# Patient Record
Sex: FEMALE | Race: BLACK OR AFRICAN AMERICAN | NOT HISPANIC OR LATINO | URBAN - METROPOLITAN AREA
[De-identification: names, ages, dates, MRNs, and addresses within clinical notes are randomized per-mention and may not be internally consistent; named-entity substitution may affect disease eponyms.]

---
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
## 2023-01-04 NOTE — ED PROVIDER NOTE - CPE EDP GASTRO NORM
normal (ped)... Partial Purse String (Simple) Text: Given the location of the defect and the characteristics of the surrounding skin a simple purse string closure was deemed most appropriate.  Undermining was performed circumferentially around the surgical defect.  A purse string suture was then placed and tightened. Wound tension of the circular defect prevented complete closure of the wound.

## 2023-01-25 ENCOUNTER — EMERGENCY (EMERGENCY)
Age: 18
LOS: 1 days | Discharge: HOME | End: 2023-01-25
Attending: EMERGENCY MEDICINE | Admitting: EMERGENCY MEDICINE
Payer: MEDICAID

## 2023-01-25 VITALS
SYSTOLIC BLOOD PRESSURE: 118 MMHG | OXYGEN SATURATION: 100 % | TEMPERATURE: 103 F | RESPIRATION RATE: 22 BRPM | WEIGHT: 231.38 LBS | DIASTOLIC BLOOD PRESSURE: 71 MMHG | HEART RATE: 135 BPM

## 2023-01-25 VITALS
OXYGEN SATURATION: 98 % | TEMPERATURE: 98 F | DIASTOLIC BLOOD PRESSURE: 49 MMHG | SYSTOLIC BLOOD PRESSURE: 107 MMHG | RESPIRATION RATE: 16 BRPM | HEART RATE: 105 BPM

## 2023-01-25 DIAGNOSIS — Z92.89 PERSONAL HISTORY OF OTHER MEDICAL TREATMENT: Chronic | ICD-10-CM

## 2023-01-25 LAB
ALBUMIN SERPL ELPH-MCNC: 4.5 G/DL — SIGNIFICANT CHANGE UP (ref 3.3–5)
ALP SERPL-CCNC: 50 U/L — SIGNIFICANT CHANGE UP (ref 40–120)
ALT FLD-CCNC: 24 U/L — SIGNIFICANT CHANGE UP (ref 4–33)
ANION GAP SERPL CALC-SCNC: 12 MMOL/L — SIGNIFICANT CHANGE UP (ref 7–14)
AST SERPL-CCNC: 21 U/L — SIGNIFICANT CHANGE UP (ref 4–32)
B PERT DNA SPEC QL NAA+PROBE: SIGNIFICANT CHANGE UP
B PERT+PARAPERT DNA PNL SPEC NAA+PROBE: SIGNIFICANT CHANGE UP
BASE EXCESS BLDV CALC-SCNC: 1.5 MMOL/L — SIGNIFICANT CHANGE UP (ref -2–3)
BASOPHILS # BLD AUTO: 0.04 K/UL — SIGNIFICANT CHANGE UP (ref 0–0.2)
BASOPHILS NFR BLD AUTO: 0.7 % — SIGNIFICANT CHANGE UP (ref 0–2)
BILIRUB SERPL-MCNC: 0.5 MG/DL — SIGNIFICANT CHANGE UP (ref 0.2–1.2)
BLOOD GAS VENOUS COMPREHENSIVE RESULT: SIGNIFICANT CHANGE UP
BORDETELLA PARAPERTUSSIS (RAPRVP): SIGNIFICANT CHANGE UP
BUN SERPL-MCNC: 11 MG/DL — SIGNIFICANT CHANGE UP (ref 7–23)
C PNEUM DNA SPEC QL NAA+PROBE: SIGNIFICANT CHANGE UP
CALCIUM SERPL-MCNC: 9.7 MG/DL — SIGNIFICANT CHANGE UP (ref 8.4–10.5)
CHLORIDE BLDV-SCNC: 103 MMOL/L — SIGNIFICANT CHANGE UP (ref 96–108)
CHLORIDE SERPL-SCNC: 100 MMOL/L — SIGNIFICANT CHANGE UP (ref 98–107)
CO2 BLDV-SCNC: 27.9 MMOL/L — HIGH (ref 22–26)
CO2 SERPL-SCNC: 24 MMOL/L — SIGNIFICANT CHANGE UP (ref 22–31)
CREAT SERPL-MCNC: 1.06 MG/DL — SIGNIFICANT CHANGE UP (ref 0.5–1.3)
EGFR: 78 ML/MIN/1.73M2 — SIGNIFICANT CHANGE UP
EOSINOPHIL # BLD AUTO: 0 K/UL — SIGNIFICANT CHANGE UP (ref 0–0.5)
EOSINOPHIL NFR BLD AUTO: 0 % — SIGNIFICANT CHANGE UP (ref 0–6)
FLUAV SUBTYP SPEC NAA+PROBE: SIGNIFICANT CHANGE UP
FLUBV RNA SPEC QL NAA+PROBE: SIGNIFICANT CHANGE UP
GAS PNL BLDV: 133 MMOL/L — LOW (ref 136–145)
GLUCOSE BLDV-MCNC: 109 MG/DL — HIGH (ref 70–99)
GLUCOSE SERPL-MCNC: 112 MG/DL — HIGH (ref 70–99)
HADV DNA SPEC QL NAA+PROBE: SIGNIFICANT CHANGE UP
HCO3 BLDV-SCNC: 27 MMOL/L — SIGNIFICANT CHANGE UP (ref 22–29)
HCOV 229E RNA SPEC QL NAA+PROBE: SIGNIFICANT CHANGE UP
HCOV HKU1 RNA SPEC QL NAA+PROBE: SIGNIFICANT CHANGE UP
HCOV NL63 RNA SPEC QL NAA+PROBE: SIGNIFICANT CHANGE UP
HCOV OC43 RNA SPEC QL NAA+PROBE: SIGNIFICANT CHANGE UP
HCT VFR BLD CALC: 41.3 % — SIGNIFICANT CHANGE UP (ref 34.5–45)
HCT VFR BLDA CALC: 41 % — SIGNIFICANT CHANGE UP (ref 34.5–46.5)
HGB BLD CALC-MCNC: 13.7 G/DL — SIGNIFICANT CHANGE UP (ref 11.5–15.5)
HGB BLD-MCNC: 13.6 G/DL — SIGNIFICANT CHANGE UP (ref 11.5–15.5)
HMPV RNA SPEC QL NAA+PROBE: SIGNIFICANT CHANGE UP
HPIV1 RNA SPEC QL NAA+PROBE: SIGNIFICANT CHANGE UP
HPIV2 RNA SPEC QL NAA+PROBE: SIGNIFICANT CHANGE UP
HPIV3 RNA SPEC QL NAA+PROBE: SIGNIFICANT CHANGE UP
HPIV4 RNA SPEC QL NAA+PROBE: SIGNIFICANT CHANGE UP
IANC: 4.45 K/UL — SIGNIFICANT CHANGE UP (ref 1.8–7.4)
IMM GRANULOCYTES NFR BLD AUTO: 0.3 % — SIGNIFICANT CHANGE UP (ref 0–0.9)
LACTATE BLDV-MCNC: 1.7 MMOL/L — SIGNIFICANT CHANGE UP (ref 0.5–2)
LYMPHOCYTES # BLD AUTO: 0.67 K/UL — LOW (ref 1–3.3)
LYMPHOCYTES # BLD AUTO: 11.6 % — LOW (ref 13–44)
M PNEUMO DNA SPEC QL NAA+PROBE: SIGNIFICANT CHANGE UP
MCHC RBC-ENTMCNC: 29 PG — SIGNIFICANT CHANGE UP (ref 27–34)
MCHC RBC-ENTMCNC: 32.9 GM/DL — SIGNIFICANT CHANGE UP (ref 32–36)
MCV RBC AUTO: 88.1 FL — SIGNIFICANT CHANGE UP (ref 80–100)
MONOCYTES # BLD AUTO: 0.58 K/UL — SIGNIFICANT CHANGE UP (ref 0–0.9)
MONOCYTES NFR BLD AUTO: 10.1 % — SIGNIFICANT CHANGE UP (ref 2–14)
NEUTROPHILS # BLD AUTO: 4.45 K/UL — SIGNIFICANT CHANGE UP (ref 1.8–7.4)
NEUTROPHILS NFR BLD AUTO: 77.3 % — HIGH (ref 43–77)
NRBC # BLD: 0 /100 WBCS — SIGNIFICANT CHANGE UP (ref 0–0)
NRBC # FLD: 0 K/UL — SIGNIFICANT CHANGE UP (ref 0–0)
PCO2 BLDV: 43 MMHG — HIGH (ref 39–42)
PH BLDV: 7.4 — SIGNIFICANT CHANGE UP (ref 7.32–7.43)
PLATELET # BLD AUTO: 315 K/UL — SIGNIFICANT CHANGE UP (ref 150–400)
PO2 BLDV: 33 MMHG — SIGNIFICANT CHANGE UP
POTASSIUM BLDV-SCNC: 3.8 MMOL/L — SIGNIFICANT CHANGE UP (ref 3.5–5.1)
POTASSIUM SERPL-MCNC: 3.7 MMOL/L — SIGNIFICANT CHANGE UP (ref 3.5–5.3)
POTASSIUM SERPL-SCNC: 3.7 MMOL/L — SIGNIFICANT CHANGE UP (ref 3.5–5.3)
PROT SERPL-MCNC: 7.9 G/DL — SIGNIFICANT CHANGE UP (ref 6–8.3)
RAPID RVP RESULT: DETECTED
RBC # BLD: 4.69 M/UL — SIGNIFICANT CHANGE UP (ref 3.8–5.2)
RBC # FLD: 12.6 % — SIGNIFICANT CHANGE UP (ref 10.3–14.5)
RSV RNA SPEC QL NAA+PROBE: SIGNIFICANT CHANGE UP
RV+EV RNA SPEC QL NAA+PROBE: SIGNIFICANT CHANGE UP
SAO2 % BLDV: 57.3 % — SIGNIFICANT CHANGE UP
SARS-COV-2 RNA SPEC QL NAA+PROBE: DETECTED
SODIUM SERPL-SCNC: 136 MMOL/L — SIGNIFICANT CHANGE UP (ref 135–145)
WBC # BLD: 5.76 K/UL — SIGNIFICANT CHANGE UP (ref 3.8–10.5)
WBC # FLD AUTO: 5.76 K/UL — SIGNIFICANT CHANGE UP (ref 3.8–10.5)

## 2023-01-25 PROCEDURE — 99284 EMERGENCY DEPT VISIT MOD MDM: CPT

## 2023-01-25 RX ORDER — SODIUM CHLORIDE 9 MG/ML
1000 INJECTION INTRAMUSCULAR; INTRAVENOUS; SUBCUTANEOUS ONCE
Refills: 0 | Status: COMPLETED | OUTPATIENT
Start: 2023-01-25 | End: 2023-01-25

## 2023-01-25 RX ORDER — IBUPROFEN 200 MG
400 TABLET ORAL ONCE
Refills: 0 | Status: COMPLETED | OUTPATIENT
Start: 2023-01-25 | End: 2023-01-25

## 2023-01-25 RX ORDER — ACETAMINOPHEN 500 MG
975 TABLET ORAL ONCE
Refills: 0 | Status: COMPLETED | OUTPATIENT
Start: 2023-01-25 | End: 2023-01-25

## 2023-01-25 RX ADMIN — SODIUM CHLORIDE 1000 MILLILITER(S): 9 INJECTION INTRAMUSCULAR; INTRAVENOUS; SUBCUTANEOUS at 04:11

## 2023-01-25 RX ADMIN — Medication 400 MILLIGRAM(S): at 02:08

## 2023-01-25 RX ADMIN — Medication 975 MILLIGRAM(S): at 04:11

## 2023-01-25 RX ADMIN — SODIUM CHLORIDE 1000 MILLILITER(S): 9 INJECTION INTRAMUSCULAR; INTRAVENOUS; SUBCUTANEOUS at 02:04

## 2023-01-25 NOTE — ED PROVIDER NOTE - ATTENDING CONTRIBUTION TO CARE
17-year-old female with no sniffing past medical history presenting with fevers lightheadedness and palpitations that started today.  Patient has no shortness of breath nausea vomiting abdominal pain.  The patient was seen at [facility] earlier today where she had labs IV fluids and a COVID swab.  Labs unremarkable patient found to have COVID at this time.  At the arrival here in the restaurant patient is still tachycardic and febrile.  We will treat the patient symptomatically with IV fluids and antipyretics.  If the patient's vital signs improved and she feels symptomatically better there is no indication for admission at this time.  [facility] this is secondary to her viral infection.  Will reassess once has gotten fluids and medications.    Insert physical exam 17-year-old female with no sniffing past medical history presenting with fevers lightheadedness and palpitations that started today.  Patient has no shortness of breath nausea vomiting abdominal pain.  The patient was seen at [facility] earlier today where she had labs IV fluids and a COVID swab.  Labs unremarkable patient found to have COVID at this time.  At the arrival here in the restaurant patient is still tachycardic and febrile.  We will treat the patient symptomatically with IV fluids and antipyretics.  If the patient's vital signs improved and she feels symptomatically better there is no indication for admission at this time.  [facility] was [facility] for [facility] this is secondary to her viral infection.  Will reassess once has gotten fluids and medications.    Vitals: I have reviewed the patients vital signs  General: nontoxic appearing  HEENT: Atraumatic, normocephalic, airway patent  Eyes: EOMI, tracking appropriately  Neck: no tracheal deviation  Chest/Lungs: no trauma, symmetric chest rise, speaking in complete sentences,  no resp distress  Heart: skin and extremities well perfused, tachycardic rate and rhythm  Neuro: A+Ox3, appears non focal  MSK: no deformities  Skin: no cyanosis, no jaundice   Psych:  Normal mood and affect

## 2023-01-25 NOTE — ED PEDIATRIC NURSE REASSESSMENT NOTE - NS ED NURSE REASSESS COMMENT FT2
pt states that thus far the bolus she received made her feel better. Pt is currently on her way to adult facility. Nurse answered all of the patient mother questions. No further discomfort noted

## 2023-01-25 NOTE — ED PROVIDER NOTE - NS ED ROS FT
CONST: + Fever, dizzy  EYES: no pain, no vision changes  ENT: + Sore throat  CV: no chest pain, no leg swelling  RESP: no shortness of breath. + Cough  ABD: no abdominal pain, no nausea, no vomiting, no diarrhea  : no dysuria, no flank pain, no hematuria  MSK: no back pain, no extremity pain  NEURO: no headache or additional neurologic complaints  SKIN:  no rash

## 2023-01-25 NOTE — ED ADULT NURSE NOTE - OBJECTIVE STATEMENT
pt received to rm17. pt A&Ox4, respirations even and unlabored, completing full sentences. pt c/o fevers, throat pain, dizziness since yesterday at home. pt was received from [facility] with IV to left ac, flushes with no resistance, + blood return. pmhx of strep throat and anaphylactic shock. pt noted to be tachycardic to 120s. Denies cp, sob, h/a and palpitation. medicated as per MD orders, fluids running as ordred. stretcher in lowest pos, siderails up, family at bedside.

## 2023-01-25 NOTE — ED ADULT NURSE NOTE - CHIEF COMPLAINT QUOTE
Transfer from [facility]. C/o throat pain, dizziness, fever and high heart rate since yesterday from home. Received with IV to left ac, received IV fluid and Motrin at [facility]. Denies cp, sob, and palpitation. hx of strep throat and anaphylactic shock

## 2023-01-25 NOTE — ED PROVIDER NOTE - PHYSICAL EXAMINATION
GENERAL: Awake, alert, NAD  HEENT: NC/AT, moist mucous membranes, EOMI  LUNGS: CTAB, no wheezes or crackles   CARDIAC: Tachycardic  ABDOMEN: Soft, non tender, non distended, no rebound, no guarding  EXT: No edema, no calf tenderness, no deformities.  NEURO: A&Ox3. Moving all extremities.  SKIN: Warm and dry. No rash.  PSYCH: Normal affect.

## 2023-01-25 NOTE — ED PROVIDER NOTE - PATIENT PORTAL LINK FT
You can access the FollowMyHealth Patient Portal offered by [facility] by registering at the following website: [email or web address]. By joining [employer]’s FollowMyHealth portal, you will also be able to view your health information using other applications (apps) compatible with our system.

## 2023-01-25 NOTE — ED PEDIATRIC TRIAGE NOTE - TEMP(CELSIUS)
Surgery Clinic Note    Zac Plunkett is a 73 y.o. 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 Incision intact with mild cellulitis, no drainage. Has f/u with thoracic surgery and oncology. No drainage. No pain.  No f/c/n/v/sob/cp    ROS:  Negative except above    Pathology:  Final Pathologic Diagnosis Part 1 (submitted as right thigh proximal mass), Part 2 (submitted as right   thigh distal mass):   -Myxofibrosarcoma, high-grade   -Both lesions appear completely excised   -Previous surgical case S -, right thigh excision, diagnosed as a   high-grade myxofibrosarcoma, was reviewed and shows features identical to   that in both parts 1 and 2 of this case,          PE:  Vitals:    01/28/22 1029   BP: 137/75   Pulse: 69       NAD  Ambulates well  No belabored breathing  Right thigh, 2x incisions. Cellulitis. No drainage no fluctuance.    A/P:  Zac Plunkett is a 73 y.o. year old male w right thigh myxofibrosarcoma    -neosporin for 5 days  -clindamycin PO for 5 days  -rtc after antibiotics. If it resolves can f/u PRN as he has appts to oncology and thoracic surgery for lung lesion    Arun Mao  General Surgery - [facility]  1/28/2022  10:45 AM           39.5

## 2023-01-25 NOTE — ED PROVIDER NOTE - NSFOLLOWUPINSTRUCTIONS_ED_ALL_ED_FT
You were found with covid. Please find your results attached to this document. Important to stay hydrated.     Follow up with your primary care doctor in 5-7 days.     May take Tylenol and Ibuprofen for fevers, chills, and/or body aches.     Please return to the ED for shortness of breath or any other concerns.

## 2023-01-25 NOTE — ED ADULT TRIAGE NOTE - CHIEF COMPLAINT QUOTE
Transfer from [facility]. C/o throat pain, dizziness, fever and high heart rate since yesterday from home. Received with IV to left ac, received IV fluid and motrin at [facility]. hx of strep throat and anaphylactic shock Transfer from [facility]. C/o throat pain, dizziness, fever and high heart rate since yesterday from home. Received with IV to left ac, received IV fluid and Motrin at [facility]. hx of strep throat and anaphylactic shock Transfer from [facility]. C/o throat pain, dizziness, fever and high heart rate since yesterday from home. Received with IV to left ac, received IV fluid and Motrin at [facility]. Denies cp, sob, and palpitation. hx of strep throat and anaphylactic shock

## 2023-01-25 NOTE — ED PROVIDER NOTE - CLINICAL SUMMARY MEDICAL DECISION MAKING FREE TEXT BOX
17-year-old female patient with no significant history who presents to the emergency department for fevers, tachycardia, dizziness.  On exam found tachycardic in the 130s, clear breath sounds.  Coming from [facility] as a transfer.  Found with COVID.  Patient febrile here and tachycardic will give Tylenol and IV fluids reassess.

## 2023-01-25 NOTE — ED PEDIATRIC NURSE NOTE - CHIEF COMPLAINT QUOTE
Patient c/o fevers, high heart rate and dizziness starting today. No medications given recently. Patient awake and alert in triage. PMHx "sepsis and shock" NKA. IUTD.  Patient meets code sepsis based on vital signs.

## 2023-01-25 NOTE — ED PROVIDER NOTE - RAPID ASSESSMENT
16 y/o F with fever and "not feeling well".  Mom states tachy to 140 at home.  On arrival had significant tachycardia and febrile.  IV bolus, labs and VBG with a lactate.  Blood cultures obtained.  Abx held because likely viral.  RVP sent as well.  VBG with 7.4 and normal lactate.  Signed out to Dr Porter.

## 2023-01-25 NOTE — ED PEDIATRIC TRIAGE NOTE - CHIEF COMPLAINT QUOTE
Patient c/o fevers, high heart rate and dizziness starting today. No medications given recently. Patient awake and alert in triage. PMHx "sepsis and shock" NKA. IUTD. Patient c/o fevers, high heart rate and dizziness starting today. No medications given recently. Patient awake and alert in triage. PMHx "sepsis and shock" NKA. IUTD.  Patient meets code sepsis based on vital signs.

## 2023-01-25 NOTE — ED PROVIDER NOTE - OBJECTIVE STATEMENT
17-year-old female patient with no significant past medical history who presents emergency department for 17-year-old female patient with no significant past medical history who presents emergency department for fevers, lightheadedness, tachycardia since today.  Patient seen at [facility] and was found with COVID.  Patient denies any shortness of breath, nausea, vomiting, abdominal pain, diarrhea or poor appetite.  Patient does endorse sore throat and coughing.  Patient is COVID vaccinated with booster.

## 2023-01-30 LAB
CULTURE RESULTS: SIGNIFICANT CHANGE UP
SPECIMEN SOURCE: SIGNIFICANT CHANGE UP

## 2023-11-22 NOTE — PATIENT PROFILE PEDIATRIC. - MEDICATION, ABILITY TO FOLLOW SCHEDULE, PROFILE
"Patient was seen today for an educational visit. Visited patient at bedside to provide education related to heart failure. Patient given education booklet entitled ""Living well with Heart Failure\"". Action:   -Taught patient contributing factors leading to heart failure  -Provided information on [facility] and services available to manage fluid volume  -Reviewed WARNING signs of Heart Failure (given CHF Zone handout)  -Reviewed low sodium diet (2,000 mg) and fluid restriction  -Educated on importance of monitoring daily weight  -Educated patient on importance on medication compliance, low sodium diet, daily blood pressure check, signs and symptoms of fluid volume excess. Response:  Patient and daughter verbalized understanding utilizing teach-back method. Will continue to follow patient while admitted. Provided 60 minutes of education on the above.   " no

## 2024-02-26 ENCOUNTER — EMERGENCY (EMERGENCY)
Facility: HOSPITAL | Age: 19
LOS: 1 days | Discharge: HOME | End: 2024-02-26
Attending: EMERGENCY MEDICINE | Admitting: EMERGENCY MEDICINE
Payer: MEDICAID

## 2024-02-26 VITALS
SYSTOLIC BLOOD PRESSURE: 115 MMHG | TEMPERATURE: 98 F | DIASTOLIC BLOOD PRESSURE: 78 MMHG | RESPIRATION RATE: 16 BRPM | OXYGEN SATURATION: 99 % | HEART RATE: 96 BPM

## 2024-02-26 VITALS
HEART RATE: 87 BPM | DIASTOLIC BLOOD PRESSURE: 87 MMHG | SYSTOLIC BLOOD PRESSURE: 127 MMHG | WEIGHT: 190.04 LBS | TEMPERATURE: 98 F | HEIGHT: 67 IN | OXYGEN SATURATION: 99 % | RESPIRATION RATE: 115 BRPM

## 2024-02-26 DIAGNOSIS — Z92.89 PERSONAL HISTORY OF OTHER MEDICAL TREATMENT: Chronic | ICD-10-CM

## 2024-02-26 LAB
ALBUMIN SERPL ELPH-MCNC: 4 G/DL — SIGNIFICANT CHANGE UP (ref 3.4–5)
ALP SERPL-CCNC: 35 U/L — LOW (ref 40–120)
ALT FLD-CCNC: 16 U/L — SIGNIFICANT CHANGE UP (ref 12–42)
ANION GAP SERPL CALC-SCNC: 8 MMOL/L — LOW (ref 9–16)
AST SERPL-CCNC: 14 U/L — LOW (ref 15–37)
BASOPHILS # BLD AUTO: 0.05 K/UL — SIGNIFICANT CHANGE UP (ref 0–0.2)
BASOPHILS NFR BLD AUTO: 0.7 % — SIGNIFICANT CHANGE UP (ref 0–2)
BILIRUB SERPL-MCNC: 0.5 MG/DL — SIGNIFICANT CHANGE UP (ref 0.2–1.2)
BUN SERPL-MCNC: 12 MG/DL — SIGNIFICANT CHANGE UP (ref 7–23)
CALCIUM SERPL-MCNC: 9 MG/DL — SIGNIFICANT CHANGE UP (ref 8.5–10.5)
CHLORIDE SERPL-SCNC: 104 MMOL/L — SIGNIFICANT CHANGE UP (ref 96–108)
CO2 SERPL-SCNC: 30 MMOL/L — SIGNIFICANT CHANGE UP (ref 22–31)
CREAT SERPL-MCNC: 1.03 MG/DL — SIGNIFICANT CHANGE UP (ref 0.5–1.3)
EGFR: 80 ML/MIN/1.73M2 — SIGNIFICANT CHANGE UP
EOSINOPHIL # BLD AUTO: 0.01 K/UL — SIGNIFICANT CHANGE UP (ref 0–0.5)
EOSINOPHIL NFR BLD AUTO: 0.1 % — SIGNIFICANT CHANGE UP (ref 0–6)
GLUCOSE SERPL-MCNC: 90 MG/DL — SIGNIFICANT CHANGE UP (ref 70–99)
HCG SERPL-ACNC: <1 MIU/ML — SIGNIFICANT CHANGE UP
HCT VFR BLD CALC: 49.8 % — HIGH (ref 34.5–45)
HGB BLD-MCNC: 16.3 G/DL — HIGH (ref 11.5–15.5)
IMM GRANULOCYTES NFR BLD AUTO: 0.3 % — SIGNIFICANT CHANGE UP (ref 0–0.9)
LIDOCAIN IGE QN: 55 U/L — SIGNIFICANT CHANGE UP (ref 16–77)
LYMPHOCYTES # BLD AUTO: 2.57 K/UL — SIGNIFICANT CHANGE UP (ref 1–3.3)
LYMPHOCYTES # BLD AUTO: 34.8 % — SIGNIFICANT CHANGE UP (ref 13–44)
MCHC RBC-ENTMCNC: 29.7 PG — SIGNIFICANT CHANGE UP (ref 27–34)
MCHC RBC-ENTMCNC: 32.7 GM/DL — SIGNIFICANT CHANGE UP (ref 32–36)
MCV RBC AUTO: 90.9 FL — SIGNIFICANT CHANGE UP (ref 80–100)
MONOCYTES # BLD AUTO: 0.44 K/UL — SIGNIFICANT CHANGE UP (ref 0–0.9)
MONOCYTES NFR BLD AUTO: 6 % — SIGNIFICANT CHANGE UP (ref 2–14)
NEUTROPHILS # BLD AUTO: 4.29 K/UL — SIGNIFICANT CHANGE UP (ref 1.8–7.4)
NEUTROPHILS NFR BLD AUTO: 58.1 % — SIGNIFICANT CHANGE UP (ref 43–77)
NRBC # BLD: 0 /100 WBCS — SIGNIFICANT CHANGE UP (ref 0–0)
NT-PROBNP SERPL-SCNC: 9 PG/ML — SIGNIFICANT CHANGE UP
PLATELET # BLD AUTO: 348 K/UL — SIGNIFICANT CHANGE UP (ref 150–400)
POTASSIUM SERPL-MCNC: 3.5 MMOL/L — SIGNIFICANT CHANGE UP (ref 3.5–5.3)
POTASSIUM SERPL-SCNC: 3.5 MMOL/L — SIGNIFICANT CHANGE UP (ref 3.5–5.3)
PROT SERPL-MCNC: 8 G/DL — SIGNIFICANT CHANGE UP (ref 6.4–8.2)
RBC # BLD: 5.48 M/UL — HIGH (ref 3.8–5.2)
RBC # FLD: 12.6 % — SIGNIFICANT CHANGE UP (ref 10.3–14.5)
SODIUM SERPL-SCNC: 142 MMOL/L — SIGNIFICANT CHANGE UP (ref 132–145)
TROPONIN I, HIGH SENSITIVITY RESULT: <4 NG/L — SIGNIFICANT CHANGE UP
WBC # BLD: 7.38 K/UL — SIGNIFICANT CHANGE UP (ref 3.8–10.5)
WBC # FLD AUTO: 7.38 K/UL — SIGNIFICANT CHANGE UP (ref 3.8–10.5)

## 2024-02-26 PROCEDURE — 72125 CT NECK SPINE W/O DYE: CPT | Mod: 26,MC

## 2024-02-26 PROCEDURE — 70450 CT HEAD/BRAIN W/O DYE: CPT | Mod: 26,MC

## 2024-02-26 PROCEDURE — 71045 X-RAY EXAM CHEST 1 VIEW: CPT | Mod: 26

## 2024-02-26 PROCEDURE — 73564 X-RAY EXAM KNEE 4 OR MORE: CPT | Mod: 26,LT

## 2024-02-26 PROCEDURE — 99285 EMERGENCY DEPT VISIT HI MDM: CPT

## 2024-02-26 RX ORDER — SODIUM CHLORIDE 9 MG/ML
1000 INJECTION INTRAMUSCULAR; INTRAVENOUS; SUBCUTANEOUS ONCE
Refills: 0 | Status: COMPLETED | OUTPATIENT
Start: 2024-02-26 | End: 2024-02-26

## 2024-02-26 RX ADMIN — SODIUM CHLORIDE 1000 MILLILITER(S): 9 INJECTION INTRAMUSCULAR; INTRAVENOUS; SUBCUTANEOUS at 19:57

## 2024-02-26 NOTE — ED PROVIDER NOTE - CLINICAL SUMMARY MEDICAL DECISION MAKING FREE TEXT BOX
18-year-old female with no significant past medical history presents with syncopal episode preceded by prodrome of lightheadedness in setting of wearing a heavy coat on a heated and crowded subway.  On exam, is afebrile, vital signs are stable.  Patient has a tender hematoma to the left scalp, tenderness over the left patella with a small abrasion over the left patella as well.  EKG normal sinus rhythm, no prolonged QT, no delta wave, no Brugada pattern, no patterns concerning for HOCM.  Suspect vasovagal syncope.  Plan for labs, IV hydration, CT head and C-spine, x-ray left knee, monitor, reassess.    Imaging negative.  Labs unrevealing.  Patient feels much better after IV hydration, wants to go home.  Will refer to cardiology as outpatient as needed.

## 2024-02-26 NOTE — ED ADULT TRIAGE NOTE - BEFAST ARM NUMBNESS
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 Colon on 05/31  \par \par \par 
No

## 2024-02-26 NOTE — ED PROVIDER NOTE - PHYSICAL EXAMINATION
Constitutional: awake and alert, in no acute distress  HEENT: head normocephalic, tender hematoma to L scalp. moist mucous membranes  Eyes: extraocular movements intact, normal conjunctiva  Neck: supple, normal ROM  Cardiovascular: regular rate   Pulmonary: no respiratory distress  Gastrointestinal: abdomen flat and nondistended, nontender to palpation  Skin: warm, dry, normal for ethnicity  Musculoskeletal: TTP to L patella with small abrasion over patella.  Full AROM/ PROM of knee.    Neurological: oriented x4, no focal neurologic deficit.   Psychiatric: calm and cooperative

## 2024-02-26 NOTE — ED PROVIDER NOTE - CARE PROVIDER_API CALL
Frankie Cordero  Cardiovascular Disease  [address] [place] [address], [address]  [place] [address]  Phone: [phone]  Fax: [phone]  Follow Up Time:    Frankie Cordero  Cardiovascular Disease  [address], [address]  [place] [address]  Phone: [phone]  Fax: [phone]  Follow Up Time:     Manda Gomez  Cardiovascular Disease  [address] [facility], [address]  [place] [address]  Phone: [phone]  Fax: [phone]  Follow Up Time:     Joan Mccormack  Cardiology  [address] [place] [address], [address]  [place] [address]  Phone: [phone]  Fax: [phone]  Follow Up Time:

## 2024-02-26 NOTE — ED PROVIDER NOTE - PATIENT PORTAL LINK FT
You can access the FollowMyHealth Patient Portal offered by [facility] by registering at the following website: [email or web address]. By joining its learning’s FollowMyHealth portal, you will also be able to view your health information using other applications (apps) compatible with our system.

## 2024-02-26 NOTE — ED ADULT TRIAGE NOTE - CHIEF COMPLAINT QUOTE
Pt BIBA S/P syncopal episode in the train. Was pulled onto platform by other passengers. A&Ox3 with VSS on arrival. Ambulatory in triage.

## 2024-02-26 NOTE — ED PROVIDER NOTE - PROVIDER TOKENS
Last Office Visit/CPE for that condition:  10/10/2022    Date of next appointment:   10/11/2023    Reason Protocol Failed:    ondansetron (ZOFRAN ODT) 4 MG disintegrating tablet 10 tablet 3 10/10/2022     Sig - Route: Place 1 tablet onto the tongue every 8 hours as needed for Nausea. - Translingual      No protocol given please advise     PROVIDER:[TOKEN:[[id number]]] PROVIDER:[TOKEN:[[id number]]],PROVIDER:[TOKEN:[[id number]]],PROVIDER:[TOKEN:[[id number]]]

## 2024-02-26 NOTE — ED ADULT NURSE NOTE - OBJECTIVE STATEMENT
Pt presents to ed BIBA for syncope with + head strike. [place] with nurses standing over her. Pt reports no change in daily habits, ate well today. Hx allergy to some antiparasitic medication. No other hx reported. NIHSS negative for any defecits. 20grac initiated and labs sent. Fluids initiated on patient as ordered. Plan of care ongoing

## 2024-02-26 NOTE — ED PROVIDER NOTE - OBJECTIVE STATEMENT
18-year-old female with no significant past medical history, currently on p.o. Cipro for UTI, presents with syncopal episode just prior to arrival on the subway.  Patient states she was wearing a heavy coat and felt very warm when she got on the subway.  States that that he was high on the subway and the subway was extremely crowded.  Patient states she felt overheated, lightheaded, nauseous, and had nowhere to sit down.  States she reached to grab a pole to steady herself, then fainted.  Patient states she bumped her head against the subway door, was helped off of the subway by bystanders who called 911 to bring her to the ED.  No chest pain or shortness of breath.  No palpitations.  No headache.  No nausea or vomiting.  No fevers or chills.  No dysuria or hematuria currently.  No flank pain.
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
## 2024-03-01 DIAGNOSIS — R55 SYNCOPE AND COLLAPSE: ICD-10-CM
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
## 2024-03-01 DIAGNOSIS — S80.212A ABRASION, LEFT KNEE, INITIAL ENCOUNTER: ICD-10-CM

## 2024-03-01 DIAGNOSIS — S00.03XA CONTUSION OF SCALP, INITIAL ENCOUNTER: ICD-10-CM

## 2024-03-15 RX ORDER — FAMOTIDINE 20 MG/1
20 TABLET, FILM COATED ORAL
Qty: 30 | Refills: 0 | Status: DISCONTINUED | COMMUNITY
Start: 2021-11-10 | End: 2024-03-15

## 2024-03-15 RX ORDER — CETIRIZINE HYDROCHLORIDE 10 MG/1
10 TABLET, COATED ORAL
Qty: 30 | Refills: 3 | Status: DISCONTINUED | COMMUNITY
Start: 2021-11-10 | End: 2024-03-15

## 2024-03-20 ENCOUNTER — NON-APPOINTMENT (OUTPATIENT)
Age: 19
End: 2024-03-20

## 2024-03-20 ENCOUNTER — APPOINTMENT (OUTPATIENT)
Dept: CARDIOLOGY | Facility: CLINIC | Age: 19
End: 2024-03-20
Payer: MEDICAID

## 2024-03-20 VITALS
HEART RATE: 102 BPM | BODY MASS INDEX: 30.53 KG/M2 | DIASTOLIC BLOOD PRESSURE: 78 MMHG | HEIGHT: 66 IN | SYSTOLIC BLOOD PRESSURE: 115 MMHG | OXYGEN SATURATION: 100 % | WEIGHT: 190 LBS

## 2024-03-20 PROCEDURE — 99205 OFFICE O/P NEW HI 60 MIN: CPT

## 2024-03-22 NOTE — REASON FOR VISIT
[Symptom and Test Evaluation] : symptom and test evaluation [FreeTextEntry1] : 19 yo F with PMHx of gallstones s/p lap mina with recent episode of syncope while on a train, evaluated in the ED, referred for cardiology evaluation.   She was on the train from [place] to [facility] for school. Train was very crowded and hot. She kept her jacket on. She started to feel really warm, room was spinning around her while standing (too crowded for a seat) and she ultimately LOC and hit her head with a bump. She was taken to the ED for evaluation. No chest pain, shortness of breath of palpitations preceeding the event. She thinks she was dehydrated. She currently exercises without limitations.   No prior cardiac history. Normal childhood, no limitations. Able to keep up with her classmates. Some learning disabilities but overcame. No issues with physical activity. Sibilings with no issues. Mom present with her at the visit.   Prior to this no other episodes of syncope, pre-syncope, chest pain, SOB, palpitations. Only hospitalized for anaphylxis to an prior antifungal cleanse.   No drugs, smoking or alcohol.   Labs at ED: negative troponin, EKG in the 100s but no concerning abnormalities.

## 2024-03-22 NOTE — PHYSICAL EXAM
[Normal Conjunctiva] : normal conjunctiva [No Carotid Bruit] : no carotid bruit [Normal Venous Pressure] : normal venous pressure [No Murmur] : no murmur [Normal S1, S2] : normal S1, S2 [No Rub] : no rub [No Gallop] : no gallop [Non Tender] : non-tender [Soft] : abdomen soft [No Edema] : no edema [Normal Gait] : normal gait [No Cyanosis] : no cyanosis [No Varicosities] : no varicosities [No Rash] : no rash [No Skin Lesions] : no skin lesions [Moves all extremities] : moves all extremities [No Focal Deficits] : no focal deficits [Normal Speech] : normal speech [Normal memory] : normal memory [Alert and Oriented] : alert and oriented [de-identified] : NAD [de-identified] : CTAB

## 2024-03-22 NOTE — ASSESSMENT
[FreeTextEntry1] : #syncope: nl EKG today with normal intervals, axis and rate. Story sounds vasovagal. Will get TTE to assess for structural abnormalities. She will monitor for different symptoms.  Basic labs.  -TTE, repeat labs -f/u once imaging and labs are completed  -discussed for syncope, important to stay hydrated, consider compression stockings if on her feet for most of the day, and if she starts to feel LH/dizzy sit down

## 2024-03-28 ENCOUNTER — NON-APPOINTMENT (OUTPATIENT)
Age: 19
End: 2024-03-28

## 2024-04-05 ENCOUNTER — APPOINTMENT (OUTPATIENT)
Dept: CARDIOLOGY | Facility: CLINIC | Age: 19
End: 2024-04-05

## 2024-04-08 LAB
ALBUMIN SERPL ELPH-MCNC: 4.6 G/DL
ALP BLD-CCNC: 43 U/L
ALT SERPL-CCNC: 15 U/L
ANION GAP SERPL CALC-SCNC: 11 MMOL/L
AST SERPL-CCNC: 15 U/L
BILIRUB SERPL-MCNC: 0.4 MG/DL
BUN SERPL-MCNC: 10 MG/DL
CALCIUM SERPL-MCNC: 9.6 MG/DL
CHLORIDE SERPL-SCNC: 103 MMOL/L
CHOLEST SERPL-MCNC: 161 MG/DL
CO2 SERPL-SCNC: 25 MMOL/L
CREAT SERPL-MCNC: 0.94 MG/DL
EGFR: 89 ML/MIN/1.73M2
ESTIMATED AVERAGE GLUCOSE: 105 MG/DL
GLUCOSE SERPL-MCNC: 86 MG/DL
HBA1C MFR BLD HPLC: 5.3 %
HCT VFR BLD CALC: 40.3 %
HDLC SERPL-MCNC: 65 MG/DL
HGB BLD-MCNC: 13.4 G/DL
LDLC SERPL CALC-MCNC: 73 MG/DL
MCHC RBC-ENTMCNC: 30.5 PG
MCHC RBC-ENTMCNC: 33.3 GM/DL
MCV RBC AUTO: 91.8 FL
NONHDLC SERPL-MCNC: 96 MG/DL
PLATELET # BLD AUTO: 324 K/UL
POTASSIUM SERPL-SCNC: 4.8 MMOL/L
PROT SERPL-MCNC: 7.4 G/DL
RBC # BLD: 4.39 M/UL
RBC # FLD: 13 %
SODIUM SERPL-SCNC: 138 MMOL/L
TRIGL SERPL-MCNC: 134 MG/DL
WBC # FLD AUTO: 6.23 K/UL

## 2024-04-24 ENCOUNTER — APPOINTMENT (OUTPATIENT)
Dept: CARDIOLOGY | Facility: CLINIC | Age: 19
End: 2024-04-24
Payer: MEDICAID

## 2024-04-24 VITALS
HEART RATE: 87 BPM | HEIGHT: 67 IN | OXYGEN SATURATION: 100 % | WEIGHT: 185 LBS | DIASTOLIC BLOOD PRESSURE: 70 MMHG | SYSTOLIC BLOOD PRESSURE: 101 MMHG | BODY MASS INDEX: 29.03 KG/M2

## 2024-04-24 DIAGNOSIS — I20.89 OTHER FORMS OF ANGINA PECTORIS: ICD-10-CM

## 2024-04-24 DIAGNOSIS — R55 SYNCOPE AND COLLAPSE: ICD-10-CM

## 2024-04-24 PROCEDURE — 99215 OFFICE O/P EST HI 40 MIN: CPT

## 2024-04-25 ENCOUNTER — APPOINTMENT (OUTPATIENT)
Dept: DERMATOLOGY | Facility: CLINIC | Age: 19
End: 2024-04-25
Payer: MEDICAID

## 2024-04-25 VITALS — WEIGHT: 185 LBS | HEIGHT: 67 IN | BODY MASS INDEX: 29.03 KG/M2

## 2024-04-25 DIAGNOSIS — L68.9 HYPERTRICHOSIS, UNSPECIFIED: ICD-10-CM

## 2024-04-25 DIAGNOSIS — L70.0 ACNE VULGARIS: ICD-10-CM

## 2024-04-25 DIAGNOSIS — L81.0 POSTINFLAMMATORY HYPERPIGMENTATION: ICD-10-CM

## 2024-04-25 DIAGNOSIS — L81.4 OTHER MELANIN HYPERPIGMENTATION: ICD-10-CM

## 2024-04-25 PROCEDURE — 99204 OFFICE O/P NEW MOD 45 MIN: CPT

## 2024-04-25 RX ORDER — TRETINOIN 0.25 MG/G
0.03 CREAM TOPICAL
Qty: 1 | Refills: 11 | Status: ACTIVE | COMMUNITY
Start: 2024-04-25 | End: 1900-01-01

## 2024-04-29 PROBLEM — R55 SYNCOPE: Status: ACTIVE | Noted: 2024-03-20

## 2024-04-29 NOTE — REASON FOR VISIT
[Symptom and Test Evaluation] : symptom and test evaluation [FreeTextEntry1] : 19 yo F with PMHx of gallstones s/p lap mina with recent episode of syncope while on a train, evaluated in the ED, referred for cardiology evaluation.   Insurance denied TTE; syncope is not a symptom. Now with palpitations and chest pain. Will redo prior auth and give them new symptoms that are concerning. No other episodes of passing out.  Labs within normal.  Prior hx: She was on the train from [place] to [facility] for school. Train was very crowded and hot. She kept her jacket on. She started to feel really warm, room was spinning around her while standing (too crowded for a seat) and she ultimately LOC and hit her head with a bump. She was taken to the ED for evaluation. No chest pain, shortness of breath of palpitations preceeding the event. She thinks she was dehydrated. She currently exercises without limitations.   No prior cardiac history. Normal childhood, no limitations. Able to keep up with her classmates. Some learning disabilities but overcame. No issues with physical activity. Sibilings with no issues. Mom present with her at the visit.   Prior to this no other episodes of syncope, pre-syncope, chest pain, SOB, palpitations. Only hospitalized for anaphylxis to an prior antifungal cleanse.   No drugs, smoking or alcohol.   Labs at ED: negative troponin, EKG in the 100s but no concerning abnormalities.

## 2024-04-29 NOTE — PHYSICAL EXAM
[Normal Conjunctiva] : normal conjunctiva [Normal Venous Pressure] : normal venous pressure [No Carotid Bruit] : no carotid bruit [Normal S1, S2] : normal S1, S2 [No Murmur] : no murmur [No Rub] : no rub [No Gallop] : no gallop [Soft] : abdomen soft [Non Tender] : non-tender [Normal Gait] : normal gait [No Edema] : no edema [No Cyanosis] : no cyanosis [No Varicosities] : no varicosities [No Rash] : no rash [No Skin Lesions] : no skin lesions [Moves all extremities] : moves all extremities [No Focal Deficits] : no focal deficits [Normal Speech] : normal speech [Alert and Oriented] : alert and oriented [Normal memory] : normal memory [de-identified] : NAD [de-identified] : CTAB

## 2024-04-29 NOTE — REASON FOR VISIT
[Symptom and Test Evaluation] : symptom and test evaluation [FreeTextEntry1] : 19 yo F with PMHx of gallstones s/p lap mina with recent episode of syncope while on a train, evaluated in the ED, referred for cardiology evaluation.   Insurance denied TTE; syncope is not a symptom. Now with palpitations and chest pain. Will redo prior auth and give them new symptoms that are concerning. No other episodes of passing out.  Labs within normal.  Prior hx: She was on the train from [place] to [facility] for school. Train was very crowded and hot. She kept her jacket on. She started to feel really warm, room was spinning around her while standing (too crowded for a seat) and she ultimately LOC and hit her head with a bump. She was taken to the ED for evaluation. No chest pain, shortness of breath of palpitations preceeding the event. She thinks she was dehydrated. She currently exercises without limitations.   No prior cardiac history. Normal childhood, no limitations. Able to keep up with her classmates. Some learning disabilities but overcame. No issues with physical activity. Sibilings with no issues. Mom present with her at the visit.   Prior to this no other episodes of syncope, pre-syncope, chest pain, SOB, palpitations. Only hospitalized for anaphylxis to an prior antifungal cleanse.   No drugs, smoking or alcohol.   Labs at ED: negative troponin, EKG in the 100s but no concerning abnormalities.

## 2024-04-29 NOTE — ASSESSMENT
[FreeTextEntry1] : #syncope: nl EKG today with normal intervals, axis and rate. Now with chest pain and palpitations. Will rearrange or TTE with prior auth.   -TTE -f/u once imaging completed

## 2024-04-29 NOTE — PHYSICAL EXAM
[Normal Conjunctiva] : normal conjunctiva [Normal Venous Pressure] : normal venous pressure [No Carotid Bruit] : no carotid bruit [Normal S1, S2] : normal S1, S2 [No Murmur] : no murmur [No Rub] : no rub [No Gallop] : no gallop [Soft] : abdomen soft [Non Tender] : non-tender [Normal Gait] : normal gait [No Edema] : no edema [No Cyanosis] : no cyanosis [No Varicosities] : no varicosities [No Rash] : no rash [No Skin Lesions] : no skin lesions [Moves all extremities] : moves all extremities [No Focal Deficits] : no focal deficits [Normal Speech] : normal speech [Alert and Oriented] : alert and oriented [Normal memory] : normal memory [de-identified] : NAD [de-identified] : CTAB

## 2024-06-12 ENCOUNTER — APPOINTMENT (OUTPATIENT)
Dept: DERMATOLOGY | Facility: CLINIC | Age: 19
End: 2024-06-12

## 2024-07-09 ENCOUNTER — EMERGENCY (EMERGENCY)
Facility: HOSPITAL | Age: 19
LOS: 1 days | Discharge: HOME | End: 2024-07-09
Attending: STUDENT IN AN ORGANIZED HEALTH CARE EDUCATION/TRAINING PROGRAM | Admitting: EMERGENCY MEDICINE
Payer: MEDICAID

## 2024-07-09 VITALS
WEIGHT: 177.03 LBS | SYSTOLIC BLOOD PRESSURE: 112 MMHG | OXYGEN SATURATION: 100 % | RESPIRATION RATE: 16 BRPM | TEMPERATURE: 101 F | HEART RATE: 125 BPM | DIASTOLIC BLOOD PRESSURE: 71 MMHG

## 2024-07-09 VITALS
OXYGEN SATURATION: 98 % | RESPIRATION RATE: 15 BRPM | SYSTOLIC BLOOD PRESSURE: 102 MMHG | HEART RATE: 91 BPM | TEMPERATURE: 99 F | DIASTOLIC BLOOD PRESSURE: 66 MMHG

## 2024-07-09 DIAGNOSIS — Z92.89 PERSONAL HISTORY OF OTHER MEDICAL TREATMENT: Chronic | ICD-10-CM

## 2024-07-09 LAB
ALBUMIN SERPL ELPH-MCNC: 4.2 G/DL — SIGNIFICANT CHANGE UP (ref 3.3–5)
ALP SERPL-CCNC: 42 U/L — SIGNIFICANT CHANGE UP (ref 40–120)
ALT FLD-CCNC: 16 U/L — SIGNIFICANT CHANGE UP (ref 4–33)
ANION GAP SERPL CALC-SCNC: 11 MMOL/L — SIGNIFICANT CHANGE UP (ref 7–14)
APPEARANCE UR: CLEAR — SIGNIFICANT CHANGE UP
APTT BLD: 32.2 SEC — SIGNIFICANT CHANGE UP (ref 24.5–35.6)
AST SERPL-CCNC: 24 U/L — SIGNIFICANT CHANGE UP (ref 4–32)
BACTERIA # UR AUTO: ABNORMAL /HPF
BASE EXCESS BLDV CALC-SCNC: -2.4 MMOL/L — LOW (ref -2–3)
BASOPHILS # BLD AUTO: 0.03 K/UL — SIGNIFICANT CHANGE UP (ref 0–0.2)
BASOPHILS NFR BLD AUTO: 0.6 % — SIGNIFICANT CHANGE UP (ref 0–2)
BILIRUB SERPL-MCNC: 0.4 MG/DL — SIGNIFICANT CHANGE UP (ref 0.2–1.2)
BILIRUB UR-MCNC: NEGATIVE — SIGNIFICANT CHANGE UP
BUN SERPL-MCNC: 9 MG/DL — SIGNIFICANT CHANGE UP (ref 7–23)
CA-I SERPL-SCNC: 1.22 MMOL/L — SIGNIFICANT CHANGE UP (ref 1.15–1.33)
CALCIUM SERPL-MCNC: 9.1 MG/DL — SIGNIFICANT CHANGE UP (ref 8.4–10.5)
CAST: 0 /LPF — SIGNIFICANT CHANGE UP (ref 0–4)
CHLORIDE BLDV-SCNC: 102 MMOL/L — SIGNIFICANT CHANGE UP (ref 96–108)
CHLORIDE SERPL-SCNC: 100 MMOL/L — SIGNIFICANT CHANGE UP (ref 98–107)
CO2 BLDV-SCNC: 25.1 MMOL/L — SIGNIFICANT CHANGE UP (ref 22–26)
CO2 SERPL-SCNC: 22 MMOL/L — SIGNIFICANT CHANGE UP (ref 22–31)
COLOR SPEC: YELLOW — SIGNIFICANT CHANGE UP
CREAT SERPL-MCNC: 0.98 MG/DL — SIGNIFICANT CHANGE UP (ref 0.5–1.3)
DIFF PNL FLD: NEGATIVE — SIGNIFICANT CHANGE UP
EGFR: 85 ML/MIN/1.73M2 — SIGNIFICANT CHANGE UP
EOSINOPHIL # BLD AUTO: 0 K/UL — SIGNIFICANT CHANGE UP (ref 0–0.5)
EOSINOPHIL NFR BLD AUTO: 0 % — SIGNIFICANT CHANGE UP (ref 0–6)
FLUAV AG NPH QL: SIGNIFICANT CHANGE UP
FLUBV AG NPH QL: SIGNIFICANT CHANGE UP
GAS PNL BLDV: 132 MMOL/L — LOW (ref 136–145)
GAS PNL BLDV: SIGNIFICANT CHANGE UP
GAS PNL BLDV: SIGNIFICANT CHANGE UP
GLUCOSE BLDV-MCNC: 68 MG/DL — LOW (ref 70–99)
GLUCOSE SERPL-MCNC: 80 MG/DL — SIGNIFICANT CHANGE UP (ref 70–99)
GLUCOSE UR QL: NEGATIVE MG/DL — SIGNIFICANT CHANGE UP
HCG SERPL-ACNC: <1 MIU/ML — SIGNIFICANT CHANGE UP
HCG UR QL: NEGATIVE — SIGNIFICANT CHANGE UP
HCO3 BLDV-SCNC: 24 MMOL/L — SIGNIFICANT CHANGE UP (ref 22–29)
HCT VFR BLD CALC: 38.3 % — SIGNIFICANT CHANGE UP (ref 34.5–45)
HCT VFR BLDA CALC: 39 % — SIGNIFICANT CHANGE UP (ref 34.5–46.5)
HGB BLD CALC-MCNC: 13.1 G/DL — SIGNIFICANT CHANGE UP (ref 11.7–16.1)
HGB BLD-MCNC: 12.8 G/DL — SIGNIFICANT CHANGE UP (ref 11.5–15.5)
IANC: 2.72 K/UL — SIGNIFICANT CHANGE UP (ref 1.8–7.4)
IMM GRANULOCYTES NFR BLD AUTO: 0.2 % — SIGNIFICANT CHANGE UP (ref 0–0.9)
INR BLD: 1.17 RATIO — SIGNIFICANT CHANGE UP (ref 0.85–1.18)
KETONES UR-MCNC: NEGATIVE MG/DL — SIGNIFICANT CHANGE UP
LACTATE BLDV-MCNC: 0.8 MMOL/L — SIGNIFICANT CHANGE UP (ref 0.5–2)
LEUKOCYTE ESTERASE UR-ACNC: ABNORMAL
LYMPHOCYTES # BLD AUTO: 1.56 K/UL — SIGNIFICANT CHANGE UP (ref 1–3.3)
LYMPHOCYTES # BLD AUTO: 32.8 % — SIGNIFICANT CHANGE UP (ref 13–44)
MCHC RBC-ENTMCNC: 29.6 PG — SIGNIFICANT CHANGE UP (ref 27–34)
MCHC RBC-ENTMCNC: 33.4 GM/DL — SIGNIFICANT CHANGE UP (ref 32–36)
MCV RBC AUTO: 88.5 FL — SIGNIFICANT CHANGE UP (ref 80–100)
MONOCYTES # BLD AUTO: 0.44 K/UL — SIGNIFICANT CHANGE UP (ref 0–0.9)
MONOCYTES NFR BLD AUTO: 9.2 % — SIGNIFICANT CHANGE UP (ref 2–14)
NEUTROPHILS # BLD AUTO: 2.72 K/UL — SIGNIFICANT CHANGE UP (ref 1.8–7.4)
NEUTROPHILS NFR BLD AUTO: 57.2 % — SIGNIFICANT CHANGE UP (ref 43–77)
NITRITE UR-MCNC: NEGATIVE — SIGNIFICANT CHANGE UP
NRBC # BLD: 0 /100 WBCS — SIGNIFICANT CHANGE UP (ref 0–0)
NRBC # FLD: 0 K/UL — SIGNIFICANT CHANGE UP (ref 0–0)
PCO2 BLDV: 45 MMHG — SIGNIFICANT CHANGE UP (ref 39–52)
PH BLDV: 7.33 — SIGNIFICANT CHANGE UP (ref 7.32–7.43)
PH UR: 6.5 — SIGNIFICANT CHANGE UP (ref 5–8)
PLATELET # BLD AUTO: 297 K/UL — SIGNIFICANT CHANGE UP (ref 150–400)
PO2 BLDV: 30 MMHG — SIGNIFICANT CHANGE UP (ref 25–45)
POTASSIUM BLDV-SCNC: 3.8 MMOL/L — SIGNIFICANT CHANGE UP (ref 3.5–5.1)
POTASSIUM SERPL-MCNC: 3.9 MMOL/L — SIGNIFICANT CHANGE UP (ref 3.5–5.3)
POTASSIUM SERPL-SCNC: 3.9 MMOL/L — SIGNIFICANT CHANGE UP (ref 3.5–5.3)
PROT SERPL-MCNC: 7.6 G/DL — SIGNIFICANT CHANGE UP (ref 6–8.3)
PROT UR-MCNC: NEGATIVE MG/DL — SIGNIFICANT CHANGE UP
PROTHROM AB SERPL-ACNC: 13.2 SEC — HIGH (ref 9.5–13)
RBC # BLD: 4.33 M/UL — SIGNIFICANT CHANGE UP (ref 3.8–5.2)
RBC # FLD: 12.4 % — SIGNIFICANT CHANGE UP (ref 10.3–14.5)
RBC CASTS # UR COMP ASSIST: 1 /HPF — SIGNIFICANT CHANGE UP (ref 0–4)
RSV RNA NPH QL NAA+NON-PROBE: SIGNIFICANT CHANGE UP
SAO2 % BLDV: 55.3 % — LOW (ref 67–88)
SARS-COV-2 RNA SPEC QL NAA+PROBE: SIGNIFICANT CHANGE UP
SODIUM SERPL-SCNC: 133 MMOL/L — LOW (ref 135–145)
SP GR SPEC: 1.01 — SIGNIFICANT CHANGE UP (ref 1–1.03)
SQUAMOUS # UR AUTO: 3 /HPF — SIGNIFICANT CHANGE UP (ref 0–5)
UROBILINOGEN FLD QL: 0.2 MG/DL — SIGNIFICANT CHANGE UP (ref 0.2–1)
WBC # BLD: 4.76 K/UL — SIGNIFICANT CHANGE UP (ref 3.8–10.5)
WBC # FLD AUTO: 4.76 K/UL — SIGNIFICANT CHANGE UP (ref 3.8–10.5)
WBC UR QL: 3 /HPF — SIGNIFICANT CHANGE UP (ref 0–5)

## 2024-07-09 PROCEDURE — 99284 EMERGENCY DEPT VISIT MOD MDM: CPT

## 2024-07-09 PROCEDURE — 93010 ELECTROCARDIOGRAM REPORT: CPT

## 2024-07-09 RX ORDER — ACETAMINOPHEN 325 MG
1000 TABLET ORAL ONCE
Refills: 0 | Status: COMPLETED | OUTPATIENT
Start: 2024-07-09 | End: 2024-07-09

## 2024-07-09 RX ORDER — SODIUM CHLORIDE 0.9 % (FLUSH) 0.9 %
1000 SYRINGE (ML) INJECTION ONCE
Refills: 0 | Status: COMPLETED | OUTPATIENT
Start: 2024-07-09 | End: 2024-07-09

## 2024-07-09 RX ADMIN — Medication 1000 MILLILITER(S): at 11:44

## 2024-07-09 RX ADMIN — Medication 400 MILLIGRAM(S): at 09:58

## 2024-07-09 RX ADMIN — Medication 1000 MILLILITER(S): at 09:58

## 2024-07-10 LAB
CULTURE RESULTS: SIGNIFICANT CHANGE UP
SPECIMEN SOURCE: SIGNIFICANT CHANGE UP

## 2024-07-13 ENCOUNTER — EMERGENCY (EMERGENCY)
Facility: HOSPITAL | Age: 19
LOS: 1 days | Discharge: HOME | End: 2024-07-13
Attending: EMERGENCY MEDICINE | Admitting: EMERGENCY MEDICINE
Payer: MEDICAID

## 2024-07-13 VITALS
WEIGHT: 175.05 LBS | RESPIRATION RATE: 14 BRPM | TEMPERATURE: 99 F | OXYGEN SATURATION: 96 % | DIASTOLIC BLOOD PRESSURE: 67 MMHG | HEIGHT: 67 IN | SYSTOLIC BLOOD PRESSURE: 101 MMHG | HEART RATE: 106 BPM

## 2024-07-13 DIAGNOSIS — Z92.89 PERSONAL HISTORY OF OTHER MEDICAL TREATMENT: Chronic | ICD-10-CM

## 2024-07-13 LAB
ALBUMIN SERPL ELPH-MCNC: 4.1 G/DL — SIGNIFICANT CHANGE UP (ref 3.3–5)
ALP SERPL-CCNC: 41 U/L — SIGNIFICANT CHANGE UP (ref 40–120)
ALT FLD-CCNC: 17 U/L — SIGNIFICANT CHANGE UP (ref 4–33)
ANION GAP SERPL CALC-SCNC: 11 MMOL/L — SIGNIFICANT CHANGE UP (ref 7–14)
APPEARANCE UR: CLEAR — SIGNIFICANT CHANGE UP
APTT BLD: 32.1 SEC — SIGNIFICANT CHANGE UP (ref 24.5–35.6)
AST SERPL-CCNC: 17 U/L — SIGNIFICANT CHANGE UP (ref 4–32)
BACTERIA # UR AUTO: NEGATIVE /HPF — SIGNIFICANT CHANGE UP
BASOPHILS # BLD AUTO: 0.04 K/UL — SIGNIFICANT CHANGE UP (ref 0–0.2)
BASOPHILS NFR BLD AUTO: 0.8 % — SIGNIFICANT CHANGE UP (ref 0–2)
BILIRUB SERPL-MCNC: 0.4 MG/DL — SIGNIFICANT CHANGE UP (ref 0.2–1.2)
BILIRUB UR-MCNC: NEGATIVE — SIGNIFICANT CHANGE UP
BUN SERPL-MCNC: 7 MG/DL — SIGNIFICANT CHANGE UP (ref 7–23)
CALCIUM SERPL-MCNC: 9.6 MG/DL — SIGNIFICANT CHANGE UP (ref 8.4–10.5)
CAST: 0 /LPF — SIGNIFICANT CHANGE UP (ref 0–4)
CHLORIDE SERPL-SCNC: 102 MMOL/L — SIGNIFICANT CHANGE UP (ref 98–107)
CO2 SERPL-SCNC: 25 MMOL/L — SIGNIFICANT CHANGE UP (ref 22–31)
COLOR SPEC: YELLOW — SIGNIFICANT CHANGE UP
CREAT SERPL-MCNC: 0.99 MG/DL — SIGNIFICANT CHANGE UP (ref 0.5–1.3)
DIFF PNL FLD: NEGATIVE — SIGNIFICANT CHANGE UP
EGFR: 84 ML/MIN/1.73M2 — SIGNIFICANT CHANGE UP
EOSINOPHIL # BLD AUTO: 0.01 K/UL — SIGNIFICANT CHANGE UP (ref 0–0.5)
EOSINOPHIL NFR BLD AUTO: 0.2 % — SIGNIFICANT CHANGE UP (ref 0–6)
FLUAV AG NPH QL: SIGNIFICANT CHANGE UP
FLUBV AG NPH QL: SIGNIFICANT CHANGE UP
GLUCOSE SERPL-MCNC: 112 MG/DL — HIGH (ref 70–99)
GLUCOSE UR QL: NEGATIVE MG/DL — SIGNIFICANT CHANGE UP
HCG SERPL-ACNC: <1 MIU/ML — SIGNIFICANT CHANGE UP
HCT VFR BLD CALC: 38.5 % — SIGNIFICANT CHANGE UP (ref 34.5–45)
HGB BLD-MCNC: 12.6 G/DL — SIGNIFICANT CHANGE UP (ref 11.5–15.5)
IANC: 1.39 K/UL — LOW (ref 1.8–7.4)
IMM GRANULOCYTES NFR BLD AUTO: 0.2 % — SIGNIFICANT CHANGE UP (ref 0–0.9)
INR BLD: 1.1 RATIO — SIGNIFICANT CHANGE UP (ref 0.85–1.18)
KETONES UR-MCNC: NEGATIVE MG/DL — SIGNIFICANT CHANGE UP
LEUKOCYTE ESTERASE UR-ACNC: ABNORMAL
LYMPHOCYTES # BLD AUTO: 2.95 K/UL — SIGNIFICANT CHANGE UP (ref 1–3.3)
LYMPHOCYTES # BLD AUTO: 58.8 % — HIGH (ref 13–44)
MCHC RBC-ENTMCNC: 29 PG — SIGNIFICANT CHANGE UP (ref 27–34)
MCHC RBC-ENTMCNC: 32.7 GM/DL — SIGNIFICANT CHANGE UP (ref 32–36)
MCV RBC AUTO: 88.5 FL — SIGNIFICANT CHANGE UP (ref 80–100)
MONOCYTES # BLD AUTO: 0.62 K/UL — SIGNIFICANT CHANGE UP (ref 0–0.9)
MONOCYTES NFR BLD AUTO: 12.4 % — SIGNIFICANT CHANGE UP (ref 2–14)
NEUTROPHILS # BLD AUTO: 1.39 K/UL — LOW (ref 1.8–7.4)
NEUTROPHILS NFR BLD AUTO: 27.6 % — LOW (ref 43–77)
NITRITE UR-MCNC: NEGATIVE — SIGNIFICANT CHANGE UP
NRBC # BLD: 0 /100 WBCS — SIGNIFICANT CHANGE UP (ref 0–0)
NRBC # FLD: 0 K/UL — SIGNIFICANT CHANGE UP (ref 0–0)
OB PNL STL: NEGATIVE — SIGNIFICANT CHANGE UP
PH UR: 6.5 — SIGNIFICANT CHANGE UP (ref 5–8)
PLATELET # BLD AUTO: 344 K/UL — SIGNIFICANT CHANGE UP (ref 150–400)
POTASSIUM SERPL-MCNC: 4 MMOL/L — SIGNIFICANT CHANGE UP (ref 3.5–5.3)
POTASSIUM SERPL-SCNC: 4 MMOL/L — SIGNIFICANT CHANGE UP (ref 3.5–5.3)
PROT SERPL-MCNC: 7.8 G/DL — SIGNIFICANT CHANGE UP (ref 6–8.3)
PROT UR-MCNC: SIGNIFICANT CHANGE UP MG/DL
PROTHROM AB SERPL-ACNC: 12.4 SEC — SIGNIFICANT CHANGE UP (ref 9.5–13)
RBC # BLD: 4.35 M/UL — SIGNIFICANT CHANGE UP (ref 3.8–5.2)
RBC # FLD: 12.2 % — SIGNIFICANT CHANGE UP (ref 10.3–14.5)
RBC CASTS # UR COMP ASSIST: 0 /HPF — SIGNIFICANT CHANGE UP (ref 0–4)
RSV RNA NPH QL NAA+NON-PROBE: SIGNIFICANT CHANGE UP
SARS-COV-2 RNA SPEC QL NAA+PROBE: SIGNIFICANT CHANGE UP
SODIUM SERPL-SCNC: 138 MMOL/L — SIGNIFICANT CHANGE UP (ref 135–145)
SP GR SPEC: 1.02 — SIGNIFICANT CHANGE UP (ref 1–1.03)
SQUAMOUS # UR AUTO: 1 /HPF — SIGNIFICANT CHANGE UP (ref 0–5)
UROBILINOGEN FLD QL: 0.2 MG/DL — SIGNIFICANT CHANGE UP (ref 0.2–1)
WBC # BLD: 5.02 K/UL — SIGNIFICANT CHANGE UP (ref 3.8–10.5)
WBC # FLD AUTO: 5.02 K/UL — SIGNIFICANT CHANGE UP (ref 3.8–10.5)
WBC UR QL: 6 /HPF — HIGH (ref 0–5)

## 2024-07-13 PROCEDURE — 99284 EMERGENCY DEPT VISIT MOD MDM: CPT

## 2024-07-13 RX ORDER — SODIUM CHLORIDE 0.9 % (FLUSH) 0.9 %
1000 SYRINGE (ML) INJECTION ONCE
Refills: 0 | Status: COMPLETED | OUTPATIENT
Start: 2024-07-13 | End: 2024-07-13

## 2024-07-13 RX ADMIN — Medication 2000 MILLILITER(S): at 22:10

## 2024-07-14 LAB
CULTURE RESULTS: SIGNIFICANT CHANGE UP
SPECIMEN SOURCE: SIGNIFICANT CHANGE UP

## 2024-08-05 NOTE — ED PEDIATRIC TRIAGE NOTE - NS ED NURSE BANDS TYPE
Name band;
[Nephrology Follow-Up] : patient is currently receiving treatment via nephrology follow-up
[Nephrology Follow-Up] : patient is currently receiving treatment via nephrology follow-up

## 2024-08-11 ENCOUNTER — NON-APPOINTMENT (OUTPATIENT)
Age: 19
End: 2024-08-11

## 2025-01-14 ENCOUNTER — NON-APPOINTMENT (OUTPATIENT)
Age: 20
End: 2025-01-14

## 2025-02-09 ENCOUNTER — EMERGENCY (EMERGENCY)
Facility: HOSPITAL | Age: 20
LOS: 1 days | Discharge: HOME | End: 2025-02-09
Attending: STUDENT IN AN ORGANIZED HEALTH CARE EDUCATION/TRAINING PROGRAM | Admitting: STUDENT IN AN ORGANIZED HEALTH CARE EDUCATION/TRAINING PROGRAM
Payer: COMMERCIAL

## 2025-02-09 VITALS
RESPIRATION RATE: 19 BRPM | WEIGHT: 190.04 LBS | DIASTOLIC BLOOD PRESSURE: 77 MMHG | SYSTOLIC BLOOD PRESSURE: 121 MMHG | HEART RATE: 88 BPM | TEMPERATURE: 99 F | OXYGEN SATURATION: 99 %

## 2025-02-09 DIAGNOSIS — Z92.89 PERSONAL HISTORY OF OTHER MEDICAL TREATMENT: Chronic | ICD-10-CM

## 2025-02-09 LAB
FLUAV AG NPH QL: SIGNIFICANT CHANGE UP
FLUBV AG NPH QL: SIGNIFICANT CHANGE UP
RSV RNA NPH QL NAA+NON-PROBE: SIGNIFICANT CHANGE UP
SARS-COV-2 RNA SPEC QL NAA+PROBE: SIGNIFICANT CHANGE UP

## 2025-02-09 PROCEDURE — 99284 EMERGENCY DEPT VISIT MOD MDM: CPT

## 2025-02-09 RX ORDER — AMOXICILLIN 250 MG
1 CAPSULE ORAL
Qty: 20 | Refills: 0
Start: 2025-02-09 | End: 2025-02-18

## 2025-02-09 RX ORDER — KETOROLAC TROMETHAMINE 10 MG
15 TABLET ORAL ONCE
Refills: 0 | Status: DISCONTINUED | OUTPATIENT
Start: 2025-02-09 | End: 2025-02-09

## 2025-02-09 RX ADMIN — Medication 15 MILLIGRAM(S): at 15:57

## 2025-02-09 NOTE — ED PROVIDER NOTE - IV ALTEPLASE EXCL ABS HIDDEN
Patient : Hilda Finnegan Age: 70 year old Sex: female   MRN: [id number] Encounter Date: 12/5/2022      History     Chief Complaint   Patient presents with   • Shortness of Breath   • Flu Like Symptoms     Rabia Springer is a 69-year-old female with past medical history of asthma, hypothyroidism, A. fib not on anticoagulation, COPD who presents to the ED endorses due to shortness of breath, subjective fever and cough.  Patient reports symptoms started a week ago approximately when she endorses shortness of breath and none productive cough.  Starting 2 days ago patient reports increased work of breathing and of became productive with clear sputum, until this morning that she had brown-tinged sputum.  In addition, she reports wheezing and subjective fever although when she measured her temperature it was normal, she denies chills.  Patient denies recent travel or any sick contact, however she uses public transportation daily.  Patient reports smoking 3 to 4 cigarettes daily, she last smoked yesterday.  Patient denies headache, visual disturbances, headache, palpitations, CP, N/V, abdominal pain, dysuria.    Allergies   Allergen Reactions   • Ofloxacin Other (See Comments)     Other Reaction(s) from [facility]: chest pain and headache     • Rofecoxib Palpitations     vioxx  Other Reaction(s) from [facility]: stomach pain     • Sulfa Antibiotics RASH     Sulfonamide antibiotics         Current Discharge Medication List      Prior to Admission Medications    Details   levothyroxine 175 MCG tablet Take 187 mcg by mouth daily.      levothyroxine 25 MCG tablet       QUEtiapine (SEROquel) 100 MG tablet Take 200 mg by mouth.      metoPROLOL succinate (TOPROL-XL) 50 MG 24 hr tablet Take 50 mg by mouth daily.      nabumetone (RELAFEN) 750 MG tablet TAKE 1 TO 2 TABLETS BY MOUTH DAILY AS NEEDED      albuterol 108 (90 Base) MCG/ACT inhaler Inhale 2 puffs into the lungs.      albuterol 108 (90 Base) MCG/ACT inhaler INHALE 1 PUFF  BY MOUTH FOUR TIMES DAILY AS DIRECTED AS NEEDED      estradiol valerate (DELESTROGEN) 40 MG/ML injection Inject 40 mg into the muscle.      Advair Diskus 250-50 MCG/ACT inhaler INHALE 1 PUFF BY MOUTH TWICE DAILY AS DIRECTED      Progesterone 200 MG Cap Take 200 mg by mouth daily.      BD Plastipak Syringe 21G X 1\" 3 ML Misc USE 1 SYRINGE ONCE EVERY 2 WEEKS AS DIRECTED      aspirin 81 MG chewable tablet Chew 81 mg by mouth daily.      estradiol valerate (DELESTROGEN) 10 MG/ML injection              No past medical history on file.    No past surgical history on file.    No family history on file.    Social History     Tobacco Use   • Smoking status: Current Every Day Smoker     Packs/day: 0.25     Types: Cigarettes     Start date: 1969   • Smokeless tobacco: Current User   Vaping Use   • Vaping Use: never used   Substance Use Topics   • Alcohol use: Not Currently   • Drug use: Never       Review of Systems   Constitutional: Positive for activity change and fatigue.   HENT: Negative.    Eyes: Negative.    Respiratory: Positive for chest tightness, shortness of breath and wheezing.    Cardiovascular: Negative.    Gastrointestinal: Negative.    Endocrine: Negative.    Genitourinary: Negative.    Musculoskeletal: Negative.    Skin: Negative.    Allergic/Immunologic: Negative.    Neurological: Negative.    Hematological: Negative.    Psychiatric/Behavioral: Negative.        Physical Exam     ED Triage Vitals [12/05/22 1046]   ED Triage Vitals Group      Temp 97.9 °F (36.6 °C)      Heart Rate (!) 103      Resp (!) 26      BP (!) 161/82      SpO2 91 %      EtCO2 mmHg       Height       Weight       Weight Scale Used       BMI (Calculated)       IBW/kg (Calculated)        Physical Exam  Vitals reviewed.   Constitutional:       Appearance: She is ill-appearing.   HENT:      Head: Normocephalic and atraumatic.      Mouth/Throat:      Mouth: Mucous membranes are moist.      Neck: Normal range of motion and neck supple.   Eyes:       Extraocular Movements: Extraocular movements intact.      Pupils: Pupils are equal, round, and reactive to light.   Cardiovascular:      Rate and Rhythm: Tachycardia present. Rhythm irregular.   Pulmonary:      Effort: Tachypnea present.      Breath sounds: Examination of the right-middle field reveals decreased breath sounds and wheezing. Examination of the left-middle field reveals decreased breath sounds and wheezing. Examination of the right-lower field reveals decreased breath sounds. Examination of the left-lower field reveals decreased breath sounds. Decreased breath sounds and wheezing present.   Abdominal:      Palpations: Abdomen is soft.   Musculoskeletal:         General: Normal range of motion.   Skin:     General: Skin is warm.   Neurological:      General: No focal deficit present.      Mental Status: She is alert.         ED Course     Procedures    Lab Results     Results for orders placed or performed during the hospital encounter of 12/05/22   Comprehensive Metabolic Panel   Result Value Ref Range    Fasting Status      Sodium 131 (L) 135 - 145 mmol/L    Potassium 4.2 3.4 - 5.1 mmol/L    Chloride 95 (L) 97 - 110 mmol/L    Carbon Dioxide 30 21 - 32 mmol/L    Anion Gap 10 7 - 19 mmol/L    Glucose 113 (H) 70 - 99 mg/dL    BUN 13 6 - 20 mg/dL    Creatinine 0.83 0.51 - 0.95 mg/dL    Glomerular Filtration Rate 75 >=60    BUN/ Creatinine Ratio 16 7 - 25    Calcium 8.7 8.4 - 10.2 mg/dL    Bilirubin, Total 0.7 0.2 - 1.0 mg/dL    GOT/AST 30 <=37 Units/L    GPT/ALT 22 <64 Units/L    Alkaline Phosphatase 91 45 - 117 Units/L    Albumin 3.7 3.6 - 5.1 g/dL    Protein, Total 7.7 6.4 - 8.2 g/dL    Globulin 4.0 2.0 - 4.0 g/dL    A/G Ratio 0.9 (L) 1.0 - 2.4   CBC with Automated Differential (performable only)   Result Value Ref Range    WBC 14.3 (H) 4.2 - 11.0 K/mcL    RBC 4.75 4.00 - 5.20 mil/mcL    HGB 14.5 12.0 - 15.5 g/dL    HCT 43.8 36.0 - 46.5 %    MCV 92.2 78.0 - 100.0 fl    MCH 30.5 26.0 - 34.0 pg     MCHC 33.1 32.0 - 36.5 g/dL    RDW-CV 13.4 11.0 - 15.0 %    RDW-SD 45.7 39.0 - 50.0 fL     140 - 450 K/mcL    NRBC 0 <=0 /100 WBC    Neutrophil, Percent 84 %    Lymphocytes, Percent 3 %    Mono, Percent 13 %    Eosinophils, Percent 0 %    Basophils, Percent 0 %    Immature Granulocytes 0 %    Absolute Neutrophils 11.9 (H) 1.8 - 7.7 K/mcL    Absolute Lymphocytes 0.4 (L) 1.0 - 4.0 K/mcL    Absolute Monocytes 1.9 (H) 0.3 - 0.9 K/mcL    Absolute Eosinophils  0.0 0.0 - 0.5 K/mcL    Absolute Basophils 0.1 0.0 - 0.3 K/mcL    Absolute Immmature Granulocytes 0.1 0.0 - 0.2 K/mcL   COVID/Flu/RSV panel   Result Value Ref Range    Rapid SARS-COV-2 by PCR Not Detected Not Detected / Detected / Presumptive Positive / Inhibitors present    Influenza A by PCR Not Detected Not Detected    Influenza B by PCR Not Detected Not Detected    RSV BY PCR Detected (A) Not Detected    Isolation Guidelines      Procedural Comment         EKG Results     EKG Interpretation  Rate: 102  Rhythm: sinus tachycardia   Abnormality: Right axis deviation.    EKG tracing interpreted by ED physician    Radiology Results     Imaging Results          XR CHEST PA AND LATERAL 2 VIEWS (Final result)  Result time 12/05/22 11:37:20    Final result                 Impression:       Borderline appearing heart size.  Suggestion of moderate hiatal hernia  containing air-filled viscus structure.  No lung consolidation.  No gross  effusions.      Electronically Signed by: MICHAEL FOX M.D.   Signed on: 12/5/2022 11:37 AM                Narrative:    EXAM: XR CHEST PA AND LATERAL 2 VIEWS    CLINICAL INDICATION: Dyspnea, chest pain    COMPARISON: None.    FINDINGS: See impression                                ED Medication Orders (From admission, onward)    Ordered Start     Status Ordering Provider    12/05/22 1515 12/05/22 1516  ipratropium-albuterol (DUONEB) 0.5-2.5 (3) MG/3ML nebulizer solution 3 mL  (albuterol-ipratropium (DUONEB) nebulization)  ONCE          Last MAR action: Given JANEL PACK    12/05/22 1515 12/05/22 1516  predniSONE (DELTASONE) tablet 40 mg  ONCE         Last MAR action: Given JANEL PACK               [facility]  Number of Diagnoses or Management Options  Diagnosis management comments: 69 yo F with PMHx asthma, hypothyroidism, A. fib not on anticoagulation, COPD who presents to the ED endorses due to shortness of breath, subjective fever and cough. Pt reports symptoms started a week ago approximately with worsening work of breathing and increase on sputum production. . On arrival sat mid to high 80s on RA that improved to low 90 on NC 2L. Labs remarkable for positive RSV, WBC 14.3, Na 131 (baseline 135), pending ddimer. CXR showed no consolidation or effusions. She received prednisone 40 mg and duoneb. Pt reported feeling better afterwards however she required 5 L O2 transitorily and continues to desaturate when speaking. Decision was made to admit to inpatient for COPD exacerbation vs viral URI vs less likely PE       Clinical Impression     No diagnosis found.    Disposition        There is no disposition no dispo time  There is no comment      Pt seen and discussed with Dr. Donnie Baker M.D  PGY 2 Internal Medicine  Please contact via [employer]               Janel Pack  Resident  12/05/22 1150     show

## 2025-02-09 NOTE — ED PROVIDER NOTE - PATIENT PORTAL LINK FT
You can access the FollowMyHealth Patient Portal offered by [facility] by registering at the following website: [email or web address]. By joining [employer]’s FollowMyHealth portal, you will also be able to view your health information using other applications (apps) compatible with our system.

## 2025-02-09 NOTE — ED PROVIDER NOTE - CLINICAL SUMMARY MEDICAL DECISION MAKING FREE TEXT BOX
20 yo F no PMH with sore throat. No fevers, cough, congesiton, chest pain, urinary symptoms. Nyquil at home. No relief. No other complaitns    Exam, well appearing, NAD, posterior OP and tonsillar swelling with redness and exudates. no uvula deviation. normal wob on RA, moving all extrmeities.    clinicall strep throat, will send PCR and flu swab then dc with abx 18 yo F no PMH with sore throat. No fevers, cough, congesiton, chest pain, urinary symptoms. Nyquil at home. No relief. No other complaints    Exam, well appearing, NAD, posterior OP and tonsillar swelling with redness and exudates. no uvula deviation. normal wob on RA, moving all extremities.    clinically strep throat, will send PCR and flu swab then dc with abx

## 2025-02-09 NOTE — ED PROVIDER NOTE - NSFOLLOWUPINSTRUCTIONS_ED_ALL_ED_FT
you likely have strep throat    we sent a test and nasal swab and you will be called with positive results    take antibiotic as prescibed    tylenol as needed for pain    return for chest pain, difficulty breathing, swallowing, unable to open jaw, or voice changes, or throat swelling

## 2025-02-09 NOTE — ED ADULT TRIAGE NOTE - NS ED TRIAGE AVPU SCALE
Medicare Wellness Visit  Plan for Preventive Care    A good way for you to stay healthy is to use preventive care.  Medicare covers many services that can help you stay healthy.* The goal of these services is to find any health problems as quickly as possible. Finding problems early can help make them easier to treat.  Your personal plan below lists the services you may need and when they are due.      Health Maintenance Summary     Shingles Vaccine (1 of 2)  Overdue - never done    Hepatitis B Vaccine (For Physician/APC Discussion) (1 of 3 - Risk 3-dose series)  Overdue - never done    COVID-19 Vaccine (5 - Moderna risk series)  Overdue since 9/10/2022    Traditional Medicare- Medicare Wellness Visit (Yearly)  Overdue since 7/15/2023    Influenza Vaccine (1)  Due since 9/1/2023    Diabetes A1C (Every 6 Months)  Next due on 2/25/2024    DM/CKD GFR (Yearly)  Next due on 8/25/2024    Depression Screening (Yearly)  Next due on 9/1/2024    Colorectal Cancer Risk - Colonoscopy (Every 3 Years)  Next due on 5/19/2026    DTaP/Tdap/Td Vaccine (4 - Td or Tdap)  Next due on 5/10/2029    Pneumococcal Vaccine 65+   Completed    Meningococcal Vaccine   Aged Out    HPV Vaccine   Aged Out           Preventive Care for Women and Men    Heart Screenings (Cardiovascular):  · Blood tests are used to check your cholesterol, lipid and triglyceride levels. High levels can increase your risk for heart disease and stroke. High levels can be treated with medications, diet and exercise. Lowering your levels can help keep your heart and blood vessels healthy.  Your provider will order these tests if they are needed.    · An ultrasound is done to see if you have an abdominal aortic aneurysm (AAA).  This is an enlargement of one of the main blood vessels that delivers blood to the body.   In the [place], 9,000 deaths are caused by AAA.  You may not even know you have this problem and as many as 1 in 3 people will have a serious problem  if it is not treated.  Early diagnosis allows for more effective treatment and cure.  If you have a family history of AAA or are a male age 65-75 who has smoked, you are at higher risk of an AAA.  Your provider can order this test, if needed.    Colorectal Screening:  · There are many tests that are used to check for cancer of your colon and rectum. You and your provider should discuss what test is best for you and when to have it done.  Options include:  · Screening Colonoscopy: exam of the entire colon, seen through a flexible lighted tube.  · Flexible Sigmoidoscopy: exam of the last third (sigmoid portion) of the colon and rectum, seen through a flexible lighted tube.  · Cologuard DNA stool test: a sample of your stool is used to screen for cancer and unseen blood in your stool.  · Fecal Occult Blood Test: a sample of your stool is studied to find any unseen blood    Flu Shot:  · An immunization that helps to prevent influenza (the flu). You should get this every year. The best time to get the shot is in the fall.    Pneumococcal Shot:  • Vaccines help prevent pneumococcal disease, which is any type of illness caused by Streptococcus pneumoniae bacteria. There are two kinds of pneumococcal vaccines available in the [place]:   o Pneumococcal conjugate vaccines (PCV20 or [id number]®)  o Pneumococcal polysaccharide vaccine (PPSV23 or [id number]®)  · For those who have never received any pneumococcal conjugate vaccine, CDC recommends PVC20 for adults 65 years or older and adults 19 through 64 years old with certain medical conditions or risk factors.   · For those who have previously received PCV13, this should be followed by a dose of PPSV23.     Hepatitis B Shot:  · An immunization that helps to protect people from getting Hepatitis B. Hepatitis B is a virus that spreads through contact with infected blood or body fluids. Many people with the virus do not have symptoms.  The virus can lead to serious  Alert-The patient is alert, awake and responds to voice. The patient is oriented to time, place, and person. The triage nurse is able to obtain subjective information. problems, such as liver disease. Some people are at higher risk than others. Your doctor will tell you if you need this shot.     Diabetes Screening:  · A test to measure sugar (glucose) in your blood is called a fasting blood sugar. Fasting means you cannot have food or drink for at least 8 hours before the test. This test can detect diabetes long before you may notice symptoms.    Glaucoma Screening:  · Glaucoma screening is performed by your eye doctor. The test measures the fluid pressure inside your eyes to determine if you have glaucoma.     Hepatitis C Screening:  · A blood test to see if you have the hepatitis C virus.  Hepatitis C attacks the liver and is a major cause of chronic liver disease.  Medicare will cover a single screening for all adults born between 1945 & 1965, or high risk patients (people who have injected illegal drugs or people who have had blood transfusions).  High risk patients who continue to inject illegal drugs can be screened for Hepatitis C every year.    Smoking and Tobacco-Use Cessation Counseling:  · Tobacco is the single greatest cause of disease and early death in our country today. Medication and counseling together can increase a person’s chance of quitting for good.   · Medicare covers two quitting attempts per year, with four counseling sessions per attempt (eight sessions in a 12 month period)    Preventive Screening tests for Women    Screening Mammograms and Breast Exams:  · An x-ray of your breasts to check for breast cancer before you or your doctor may be able to feel it.  If breast cancer is found early it can usually be treated with success.    Pelvic Exams and Pap Tests:  · An exam to check for cervical and vaginal cancer. A Pap test is a lab test in which cells are taken from your cervix and sent to the lab to look for signs of cervical cancer. If cancer of the cervix is found early, chances for a cure are good. Testing can generally end at age 65, or if a woman  has a hysterectomy for a benign condition. Your provider may recommend more frequent testing if certain abnormal results are found.    Bone Mass Measurements:  · A painless x-ray of your bone density to see if you are at risk for a broken bone. Bone density refers to the thickness of bones or how tightly the bone tissue is packed.    Preventive Screening tests for Men    Prostate Screening:  · Should you have a prostate cancer test (PSA)?  It is up to you to decide if you want a prostate cancer test. Talk to your clinician to find out if the test is right for you.  Things for you to consider and talk about should include:  · Benefits and harms of the test  · Your family history  · How your race/ethnicity may influence the test  · If the test may impact other medical conditions you have  · Your values on screenings and treatments    *Medicare pays for many preventive services to keep you healthy. For some of these services, you might have to pay a deductible, coinsurance, and / or copayment.  The amounts vary depending on the type of services you need and the kind of Medicare health plan you have.    For further details on screenings offered by Medicare please visit: https://www.medicare.gov/coverage/preventive-screening-services

## 2025-02-09 NOTE — ED PROVIDER NOTE - ATTENDING CONTRIBUTION TO CARE
18yo F otherwise healthy pw 2 days of sore throat and pain w swallowing, no fevers, able to tolerate PO, feels she has some trouble breathign when lying down flat but improves when she uses a puillow, no uri sx, no nasuea, no vomiting  throat with erythema nd exudate, no PTA  rory strep vs viral pharyngitis  will send flu swab, treat for strep with amoxicillin

## 2025-02-10 ENCOUNTER — TRANSCRIPTION ENCOUNTER (OUTPATIENT)
Age: 20
End: 2025-02-10

## 2025-02-10 LAB — S PYO DNA THROAT QL NAA+PROBE: SIGNIFICANT CHANGE UP

## 2025-04-01 NOTE — ED PEDIATRIC TRIAGE NOTE - NS ED NURSE BANDS TYPE
Subjective:   Martina Sheikh is a 34 year old female who presents for Lump (LT wrist, has gotten bigger size, painful to the touch.)     Saw me in January for same - thought to be a bone spur, tx with naproxen. Lump is located on distal radius. Now getting larger. It is not painful unless something rubs against it. No redness or swelling. She will be starting PT on her L wrist for tendonitis (which was present prior to the lump).      History/Other:    Chief Complaint Reviewed and Verified  Nursing Notes Reviewed and   Verified  Tobacco Reviewed  Allergies Reviewed  Medications Reviewed    Problem List Reviewed  Medical History Reviewed  Surgical History   Reviewed  OB Status Reviewed  Family History Reviewed  Social History   Reviewed         Tobacco:  She has never smoked tobacco.    Current Outpatient Medications   Medication Sig Dispense Refill    metFORMIN  MG Oral Tablet 24 Hr Take 1 tablet (500 mg total) by mouth 2 (two) times daily with meals. 180 tablet 3    methocarbamol 750 MG Oral Tab Take 1 tablet (750 mg total) by mouth 3 (three) times daily as needed. (Patient not taking: Reported on 4/1/2025) 8 tablet 0    acidophilus-pectin Oral Cap Take 1 capsule by mouth daily. (Patient not taking: Reported on 4/1/2025)      APPLE CIDER VINEGAR OR Take by mouth. (Patient not taking: Reported on 4/1/2025)           Review of Systems:  Review of Systems  10 point review of systems otherwise negative with the exception of HPI and assessment and plan.    Objective:   /74   Pulse 64   Temp 97.5 °F (36.4 °C) (Temporal)   Ht 5' 6\" (1.676 m)   Wt 211 lb (95.7 kg)   LMP  (LMP Unknown)   SpO2 97%   BMI 34.06 kg/m²  Estimated body mass index is 34.06 kg/m² as calculated from the following:    Height as of this encounter: 5' 6\" (1.676 m).    Weight as of this encounter: 211 lb (95.7 kg).      Physical Exam  Vitals reviewed.   Musculoskeletal:      Left forearm: Bony tenderness present. No  swelling or edema.        Arms:       Comments: Increased size of bony prominence of the distal radius. Area is hard, immobile - possible bone spur vs ganglion cyst. No erythema, fluctuance, surrounding edema.   Neurological:      Mental Status: She is alert.         Assessment & Plan:   1. Lump of left wrist (Primary)  -     XR WRIST COMPLETE (MIN 3 VIEWS), LEFT (CPT=73110); Future; Expected date: 04/01/2025  - Due to increasing size of the bony prominence of the distal radius will obtain xrays. Ganglion cyst vs bone spur. OTC pain meds as needed. Further dispo pending xray results - may need US to further characterize vs ortho hand if ganglion cyst.        Return if symptoms worsen or fail to improve.    Milly Lewis, LEESA, 4/1/2025, 10:06 AM     This note was prepared using Dragon Medical voice recognition dictation software. As a result errors may occur. When identified, these errors have been corrected. While every attempt is made to correct errors during dictation discrepancies may still exist.   Name band;

## 2025-04-15 NOTE — PATIENT PROFILE PEDIATRIC. - SLEEP LOCATION, PEDS PROFILE
Problem: Skin  Goal: Participates in plan/prevention/treatment measures  Outcome: Progressing  Flowsheets (Taken 4/15/2025 0609)  Participates in plan/prevention/treatment measures: Increase activity/out of bed for meals  Goal: Prevent/manage excess moisture  Outcome: Progressing  Flowsheets (Taken 4/15/2025 0609)  Prevent/manage excess moisture: Moisturize dry skin  Goal: Prevent/minimize sheer/friction injuries  Outcome: Progressing  Flowsheets (Taken 4/15/2025 0609)  Prevent/minimize sheer/friction injuries:   Complete micro-shifts as needed if patient unable. Adjust patient position to relieve pressure points, not a full turn   Increase activity/out of bed for meals   Use pull sheet   HOB 30 degrees or less   Turn/reposition every 2 hours/use positioning/transfer devices   Utilize specialty bed per algorithm  Goal: Promote/optimize nutrition  Outcome: Progressing  Flowsheets (Taken 4/15/2025 0609)  Promote/optimize nutrition: Monitor/record intake including meals  Goal: Promote skin healing  Outcome: Progressing  Flowsheets (Taken 4/15/2025 0609)  Promote skin healing:   Assess skin/pad under line(s)/device(s)   Protective dressings over bony prominences   Turn/reposition every 2 hours/use positioning/transfer devices   Ensure correct size (line/device) and apply per  instructions   Rotate device position/do not position patient on device     Problem: Pain - Adult  Goal: Verbalizes/displays adequate comfort level or baseline comfort level  Outcome: Progressing     Problem: Safety - Adult  Goal: Free from fall injury  Outcome: Progressing     Problem: Fall/Injury  Goal: Not fall by end of shift  Outcome: Progressing  Goal: Be free from injury by end of the shift  Outcome: Progressing  Goal: Verbalize understanding of personal risk factors for fall in the hospital  Outcome: Progressing       
none
bed